# Patient Record
Sex: MALE | Race: WHITE | URBAN - METROPOLITAN AREA
[De-identification: names, ages, dates, MRNs, and addresses within clinical notes are randomized per-mention and may not be internally consistent; named-entity substitution may affect disease eponyms.]

---

## 2017-04-05 ENCOUNTER — TELEPHONE (OUTPATIENT)
Dept: FAMILY MEDICINE | Facility: CLINIC | Age: 32
End: 2017-04-05

## 2017-04-05 NOTE — TELEPHONE ENCOUNTER
Panel Management Review      Patient has the following on his problem list:     Depression / Dysthymia review  PHQ-9 SCORE 7/2/2012 7/11/2012 9/8/2016   Total Score 6 10 -   Total Score - - 15      Patient is due for:  PHQ9 and JOSE ALFREDO      Composite cancer screening  Chart review shows that this patient is due/due soon for the following None  Summary:    Patient is due/failing the following:   PHQ9    Action needed:   Patient needs to do PHQ9.    Type of outreach:    Sent G[employer] message.    Questions for provider review:    None                                                                                   Idalia Batista CMA

## 2017-09-26 NOTE — TELEPHONE ENCOUNTER
PHQ-9 SCORE 7/11/2012 9/8/2016 9/26/2017   Total Score 10 - -   Total Score MyChart - - 12 (Moderate depression)   Total Score - 15 12     JOSE ALFREDO-7 SCORE 7/11/2012 9/8/2016 9/26/2017   Total Score 6 - -   Total Score - - 9 (mild anxiety)   Total Score - 8 9       Patient responded to questionnaires  Will send patient my chart message for office visit.   Idalia Batista, CMA

## 2017-09-27 ENCOUNTER — OFFICE VISIT (OUTPATIENT)
Dept: FAMILY MEDICINE | Facility: CLINIC | Age: 32
End: 2017-09-27
Payer: COMMERCIAL

## 2017-09-27 VITALS
BODY MASS INDEX: 26.49 KG/M2 | WEIGHT: 187.3 LBS | SYSTOLIC BLOOD PRESSURE: 125 MMHG | DIASTOLIC BLOOD PRESSURE: 84 MMHG | RESPIRATION RATE: 16 BRPM | HEART RATE: 59 BPM

## 2017-09-27 DIAGNOSIS — F33.0 MAJOR DEPRESSIVE DISORDER, RECURRENT EPISODE, MILD (H): ICD-10-CM

## 2017-09-27 DIAGNOSIS — Z23 NEED FOR TDAP VACCINATION: Primary | ICD-10-CM

## 2017-09-27 DIAGNOSIS — F41.8 DEPRESSION WITH ANXIETY: ICD-10-CM

## 2017-09-27 PROCEDURE — 90471 IMMUNIZATION ADMIN: CPT | Performed by: FAMILY MEDICINE

## 2017-09-27 PROCEDURE — 90715 TDAP VACCINE 7 YRS/> IM: CPT | Performed by: FAMILY MEDICINE

## 2017-09-27 PROCEDURE — 99214 OFFICE O/P EST MOD 30 MIN: CPT | Mod: 25 | Performed by: FAMILY MEDICINE

## 2017-09-27 RX ORDER — BUPROPION HYDROCHLORIDE 150 MG/1
150 TABLET ORAL EVERY MORNING
Qty: 90 TABLET | Refills: 3 | Status: SHIPPED | OUTPATIENT
Start: 2017-09-27

## 2017-09-27 NOTE — NURSING NOTE
"Chief Complaint   Patient presents with     Depression     Would like to discuss taking both medications at the same time. Has been having lack of interest, and has been feeling down more. Has been out of medicatoin for the last week.       Initial /84  Pulse 59  Resp 16  Wt 187 lb 4.8 oz (85 kg)  BMI 26.49 kg/m2 Estimated body mass index is 26.49 kg/(m^2) as calculated from the following:    Height as of 9/8/16: 5' 10.5\" (1.791 m).    Weight as of this encounter: 187 lb 4.8 oz (85 kg).  Medication Reconciliation: complete      "

## 2017-09-27 NOTE — MR AVS SNAPSHOT
After Visit Summary   9/27/2017    Moises Matias    MRN: [id number]           Patient Information     Date Of Birth          1985        Visit Information        Provider Department      9/27/2017 4:00 PM Yesenia Malone MD [facility]        Today's Diagnoses     Need for Tdap vaccination    -  1    Major depressive disorder, recurrent episode, mild (H)        Depression with anxiety           Follow-ups after your visit        Follow-up notes from your care team     Return in about 4 weeks (around 10/25/2017), or if symptoms worsen or fail to improve.      Who to contact     Normal or non-critical lab and imaging results will be communicated to you by "[employer]hart, letter or phone within 4 business days after the clinic has received the results. If you do not hear from us within 7 days, please contact the clinic through "[employer]hart or phone. If you have a critical or abnormal lab result, we will notify you by phone as soon as possible.  Submit refill requests through [employer] or call your pharmacy and they will forward the refill request to us. Please allow 3 business days for your refill to be completed.          If you need to speak with a  for additional information , please call: [phone]             Additional Information About Your Visit        MyChart Information     [employer] gives you secure access to your electronic health record. If you see a primary care provider, you can also send messages to your care team and make appointments. If you have questions, please call your primary care clinic.  If you do not have a primary care provider, please call [phone] and they will assist you.        Care EveryWhere ID     This is your Care EveryWhere ID. This could be used by other organizations to access your [place] medical records  [id number]        Your Vitals Were     Pulse Respirations BMI (Body Mass Index)             59 16 26.49 kg/m2          Blood  Pressure from Last 3 Encounters:   09/27/17 125/84   12/28/16 109/75   09/08/16 110/82    Weight from Last 3 Encounters:   09/27/17 187 lb 4.8 oz (85 kg)   12/28/16 171 lb 12.8 oz (77.9 kg)   09/08/16 168 lb (76.2 kg)              We Performed the Following     TDAP VACCINE (ADACEL)          Where to get your medicines      These medications were sent to [facility] [place], [place] - [address]  [address] [facility] [address]     Phone:  [phone]     buPROPion 150 MG 24 hr tablet    FLUoxetine 20 MG capsule          Primary Care Provider Office Phone # Fax #    Yesenia Malone [phone] [address]  [facility] [address]        Equal Access to Services     [facility]: Hadii trey collins hadasho Soomaali, waaxda luqadaha, qaybta kaalmada adeegyada, nabil cormier haykristy crabtree . So [facility] [phone].    ATENCIÓN: Si habla español, tiene a rai disposición servicios gratuitos de asistencia lingüística. Llame al [phone].    We comply with applicable federal civil rights laws and [place] laws. We do not discriminate on the basis of race, color, national origin, age, disability, sex, sexual orientation, or gender identity.            Thank you!     Thank you for choosing [facility]  for your care. Our goal is always to provide you with excellent care. Hearing back from our patients is one way we can continue to improve our services. Please take a few minutes to complete the written survey that you may receive in the mail after your visit with us. Thank you!             Your Updated Medication List - Protect others around you: Learn how to safely use, store and throw away your medicines at www.disposemymeds.org.          This list is accurate as of: 9/27/17 11:59 PM.  Always use your most recent med list.                   Brand Name Dispense Instructions for use Diagnosis    buPROPion 150 MG 24 hr tablet    WELLBUTRIN XL    90 tablet     Take 1 tablet (150 mg) by mouth every morning    Major depressive disorder, recurrent episode, mild (H)       FLUoxetine 20 MG capsule    PROzac    90 capsule    Take 1 capsule (20 mg) by mouth daily    Major depressive disorder, recurrent episode, mild (H), Depression with anxiety

## 2017-09-27 NOTE — PROGRESS NOTES
SUBJECTIVE:   Moises Matias is a 32 year old male who presents to clinic today for the following health issues:      Depression and Anxiety Follow-Up    Status since last visit: Worsened due to being off of medication for a week    Other associated symptoms:None    Complicating factors:     Significant life event: No     Current substance abuse: None    PHQ-9 SCORE 7/11/2012 9/8/2016 9/26/2017   Total Score 10 - -   Total Score MyChart - - 12 (Moderate depression)   Total Score - 15 12     JOSE ALFREDO-7 SCORE 7/11/2012 9/8/2016 9/26/2017   Total Score 6 - -   Total Score - - 9 (mild anxiety)   Total Score - 8 9       PHQ-9  English  PHQ-9   Any Language  GAD7      Amount of exercise or physical activity: 2-3 days/week for an average of 15-30 minutes    Problems taking medications regularly: No    Medication side effects: none  Diet: regular (no restrictions)      He is better when he is on the wellbutrin and the prozac but he has run out of them both only took them both for 3 months and then just had the wellbutrin   The prozac helps with the anxiety has had this for quite a while and he finally started the medication last year. He did not come back in for refills because he got busy and he did feel worse but had the wellbutrin for 6 months and now he has been off of both for over 6 months . No suicidal does not want to hurt anyone  Anxiety causes him to be short with his family. He is not as depressed as last year whe ne was down. He is now having just the anxiety . Feels edgy     He also got whooping cough last year and he declined his shot last year when I offered him.   He would like to get vaccinated this  Year.                 Problem list and histories reviewed & adjusted, as indicated.  Additional history: as documented    Patient Active Problem List   Diagnosis     Mild major depression (H)     Chronic rhinitis     CARDIOVASCULAR SCREENING; LDL GOAL LESS THAN 160     Depression with anxiety     Past Surgical  History:   Procedure Laterality Date     EXTRACTION(S) DENTAL      wisdom teeth.       Social History   Substance Use Topics     Smoking status: Never Smoker     Smokeless tobacco: Current User     Types: Chew     Alcohol use No     Family History   Problem Relation Age of Onset     Breast Cancer Paternal Grandmother              Reviewed and updated as needed this visit by clinical staffTobacco  Allergies  Meds  Med Hx  Surg Hx  Fam Hx  Soc Hx      Reviewed and updated as needed this visit by Provider         ROS:  Constitutional, HEENT, cardiovascular, pulmonary, gi and gu systems are negative, except as otherwise noted.      OBJECTIVE:   /84  Pulse 59  Resp 16  Wt 187 lb 4.8 oz (85 kg)  BMI 26.49 kg/m2  Body mass index is 26.49 kg/(m^2).  GENERAL: healthy, alert and no distress  NECK: no adenopathy, no asymmetry, masses, or scars and thyroid normal to palpation  RESP: lungs clear to auscultation - no rales, rhonchi or wheezes  CV: regular rate and rhythm, normal S1 S2, no S3 or S4, no murmur, click or rub, no peripheral edema and peripheral pulses strong  ABDOMEN: soft, nontender, no hepatosplenomegaly, no masses and bowel sounds normal  MS: no gross musculoskeletal defects noted, no edema  MENTAL STATUS EXAM:    1. Clinical observations: Moises was clean and was adequately groomed. Moises's emotional presentation was open and cooperative. He spoke clear and articulate. He maintained good eye contact and he was cooperative in answering questions.   2. He appeared to be well-oriented in all spheres with coherent, logical, goal directed and relevent thinking.   3. Thought content: He denies no abnormal thought process.   4. Affect and mood: Moises's affect is described as normal/appropriate and his emotional attitude was open and cooperative. He reports the following sypmtoms: difficulty sleeping, too much worry, fear of losing control, nervous or tense feeling and feeling angry or frustrated.     5. Sensorium and cognition: He was in contact with reality and oriented to time, place and person.  He demonstrated no impairment in immediate, recent, or remote memory. His insight was adequate and his  intelligence appeared to be average.        Diagnostic Test Results:  none     ASSESSMENT/PLAN:         1. Major depressive disorder, recurrent episode, mild (H)    - FLUoxetine (PROZAC) 20 MG capsule; Take 1 capsule (20 mg) by mouth daily  Dispense: 90 capsule; Refill: 3  - buPROPion (WELLBUTRIN XL) 150 MG 24 hr tablet; Take 1 tablet (150 mg) by mouth every morning  Dispense: 90 tablet; Refill: 3    2. Depression with anxiety    - FLUoxetine (PROZAC) 20 MG capsule; Take 1 capsule (20 mg) by mouth daily  Dispense: 90 capsule; Refill: 3    3. Need for Tdap vaccination    - TDAP VACCINE (ADACEL)    FUTURE APPOINTMENTS:       - Follow-up office or E-visit in 1 month if not improving    Yesenia Malone MD  [facility]

## 2018-10-29 ENCOUNTER — OFFICE VISIT (OUTPATIENT)
Dept: FAMILY MEDICINE | Facility: CLINIC | Age: 33
End: 2018-10-29
Payer: COMMERCIAL

## 2018-10-29 VITALS
HEIGHT: 70 IN | WEIGHT: 196.5 LBS | DIASTOLIC BLOOD PRESSURE: 76 MMHG | BODY MASS INDEX: 28.13 KG/M2 | HEART RATE: 63 BPM | SYSTOLIC BLOOD PRESSURE: 126 MMHG | TEMPERATURE: 98.3 F

## 2018-10-29 DIAGNOSIS — F41.8 DEPRESSION WITH ANXIETY: ICD-10-CM

## 2018-10-29 DIAGNOSIS — F33.0 MAJOR DEPRESSIVE DISORDER, RECURRENT EPISODE, MILD (H): ICD-10-CM

## 2018-10-29 PROCEDURE — 99213 OFFICE O/P EST LOW 20 MIN: CPT | Performed by: FAMILY MEDICINE

## 2018-10-29 RX ORDER — BUPROPION HYDROCHLORIDE 300 MG/1
300 TABLET ORAL EVERY MORNING
Qty: 90 TABLET | Refills: 3 | Status: SHIPPED | OUTPATIENT
Start: 2018-10-29

## 2018-10-29 ASSESSMENT — PATIENT HEALTH QUESTIONNAIRE - PHQ9
5. POOR APPETITE OR OVEREATING: NOT AT ALL
SUM OF ALL RESPONSES TO PHQ QUESTIONS 1-9: 11

## 2018-10-29 ASSESSMENT — ANXIETY QUESTIONNAIRES
5. BEING SO RESTLESS THAT IT IS HARD TO SIT STILL: SEVERAL DAYS
6. BECOMING EASILY ANNOYED OR IRRITABLE: MORE THAN HALF THE DAYS
2. NOT BEING ABLE TO STOP OR CONTROL WORRYING: NOT AT ALL
7. FEELING AFRAID AS IF SOMETHING AWFUL MIGHT HAPPEN: NOT AT ALL
3. WORRYING TOO MUCH ABOUT DIFFERENT THINGS: NOT AT ALL
1. FEELING NERVOUS, ANXIOUS, OR ON EDGE: SEVERAL DAYS
GAD7 TOTAL SCORE: 4

## 2018-10-29 NOTE — PATIENT INSTRUCTIONS
Please send a Aigout update in 4-6 weeks   If you are feeling worse instead of better let me know sooner

## 2018-10-29 NOTE — PROGRESS NOTES
SUBJECTIVE:   Moises Matias is a 33 year old male who presents to clinic today for the following health issues:    Depression and Anxiety Follow-Up    Status since last visit: Depression has worsened, feels like the Wellbutrin is not working as well.  Anxiety is doing well with the Prozac    Other associated symptoms:None    Complicating factors:     Significant life event: No     Current substance abuse: None    PHQ 9/8/2016 9/26/2017   PHQ-9 Total Score 15 12   Q9: Suicide Ideation Several days Not at all     JOSE ALFREDO-7 SCORE 7/11/2012 9/8/2016 9/26/2017   Total Score 6 - -   Total Score - - 9 (mild anxiety)   Total Score - 8 9     In the past two weeks have you had thoughts of suicide or self-harm?  No.    Do you have concerns about your personal safety or the safety of others?   No  PHQ-9  English  PHQ-9   Any Language  JOSE ALFREDO-7  Suicide Assessment Five-step Evaluation and Treatment (SAFE-T)    Amount of exercise or physical activity: Very active at work, outside of that none really    Problems taking medications regularly: No    Medication side effects: none    Diet: regular (no restrictions)        Depression is a bit worse   Anxiety is fine he has been o these medications for quite a while better on than off   We reviewewd his past doses   He had wanted to decrease doses so decreased the wellbutrin several years ago      Problem list and histories reviewed & adjusted, as indicated.  Additional history: as documented    Patient Active Problem List   Diagnosis     Mild major depression (H)     Chronic rhinitis     CARDIOVASCULAR SCREENING; LDL GOAL LESS THAN 160     Depression with anxiety     Past Surgical History:   Procedure Laterality Date     EXTRACTION(S) DENTAL      wisdom teeth.       Social History   Substance Use Topics     Smoking status: Never Smoker     Smokeless tobacco: Current User     Types: Chew     Alcohol use No     Family History   Problem Relation Age of Onset     Breast Cancer Paternal  "Grandmother            Reviewed and updated as needed this visit by clinical staff  Allergies       Reviewed and updated as needed this visit by Provider         ROS:  Constitutional, HEENT, cardiovascular, pulmonary, gi and gu systems are negative, except as otherwise noted.    OBJECTIVE:     /76  Pulse 63  Temp 98.3  F (36.8  C) (Tympanic)  Ht 5' 10.35\" (1.787 m)  Wt 196 lb 8 oz (89.1 kg)  BMI 27.91 kg/m2  Body mass index is 27.91 kg/(m^2).  GENERAL APPEARANCE: healthy, alert and no distress  MENTAL STATUS EXAM:    1. Clinical observations: Moises was clean and was adequately groomed. Moises's emotional presentation was open and cooperative. He spoke clear and articulate. He maintained good eye contact and he was cooperative in answering questions.   2. He appeared to be well-oriented in all spheres with coherent, logical, goal directed and relevent thinking.   3. Thought content: He denies no abnormal thought process.   4. Affect and mood: Moises's affect is described as normal/appropriate and his emotional attitude was open and cooperative. He reports the following sypmtoms: sad feeling or depressed, difficulty concentrating and lack of interest or enjoyment.    5. Sensorium and cognition: He was in contact with reality and oriented to time, place and person.  He demonstrated no impairment in immediate, recent, or remote memory. His insight was adequate and his  intelligence appeared to be average.    Diagnostic Test Results:  none     ASSESSMENT/PLAN:         BMI:   Estimated body mass index is 27.91 kg/(m^2) as calculated from the following:    Height as of this encounter: 5' 10.35\" (1.787 m).    Weight as of this encounter: 196 lb 8 oz (89.1 kg).   Weight management plan: Discussed healthy diet and exercise guidelines and patient will follow up in 12 months in clinic to re-evaluate.      1. Major depressive disorder, recurrent episode, mild (H)    - FLUoxetine (PROZAC) 20 MG capsule; Take 1 " capsule (20 mg) by mouth daily  Dispense: 90 capsule; Refill: 3  - buPROPion (WELLBUTRIN XL) 300 MG 24 hr tablet; Take 1 tablet (300 mg) by mouth every morning  Dispense: 90 tablet; Refill: 3    2. Depression with anxiety    - FLUoxetine (PROZAC) 20 MG capsule; Take 1 capsule (20 mg) by mouth daily  Dispense: 90 capsule; Refill: 3  Patient Instructions   Please send a mychart update in 4-6 weeks   If you are feeling worse instead of better let me know sooner       See Patient Instructions    Yesenia Malone MD  [facility]

## 2018-10-30 ASSESSMENT — ANXIETY QUESTIONNAIRES: GAD7 TOTAL SCORE: 4

## 2020-02-10 ENCOUNTER — HEALTH MAINTENANCE LETTER (OUTPATIENT)
Age: 35
End: 2020-02-10

## 2021-03-28 ENCOUNTER — HEALTH MAINTENANCE LETTER (OUTPATIENT)
Age: 36
End: 2021-03-28

## 2022-04-24 ENCOUNTER — HEALTH MAINTENANCE LETTER (OUTPATIENT)
Age: 37
End: 2022-04-24

## 2023-06-01 ENCOUNTER — HEALTH MAINTENANCE LETTER (OUTPATIENT)
Age: 38
End: 2023-06-01